# Patient Record
Sex: MALE | Race: WHITE
[De-identification: names, ages, dates, MRNs, and addresses within clinical notes are randomized per-mention and may not be internally consistent; named-entity substitution may affect disease eponyms.]

---

## 2017-10-29 ENCOUNTER — HOSPITAL ENCOUNTER (EMERGENCY)
Dept: HOSPITAL 10 - E/R | Age: 62
Discharge: HOME | End: 2017-10-29
Payer: COMMERCIAL

## 2017-10-29 VITALS
WEIGHT: 186.29 LBS | HEIGHT: 78 IN | BODY MASS INDEX: 21.55 KG/M2 | HEIGHT: 78 IN | WEIGHT: 186.29 LBS | BODY MASS INDEX: 21.55 KG/M2

## 2017-10-29 DIAGNOSIS — I89.1: Primary | ICD-10-CM

## 2017-10-29 PROCEDURE — 99284 EMERGENCY DEPT VISIT MOD MDM: CPT

## 2017-10-29 NOTE — ERD
ER Documentation


Chief Complaint


Chief Complaint


swollen right side of throat x2 days





HPI


62-year-old male presents emergency room with right lateral neck pain and 

swelling that started about 2-3 days ago.  The patient's describes as sharp and 

achy pain as moderate and radiates to his ear and his head.  He has no other 

associated symptoms including nasal congestion, cough, sore throat, dental 

pain.  He denies fevers or chills.  He denies trouble swallowing.





ROS


All systems reviewed and are negative except as per history of present illness.





Medications


Home Meds


Active Scripts


Hydrocodone/Acetaminophen (Norco 5-325 Tablet) 1 Each Tablet, 1 TAB PO Q6H Y 

for PAIN, #7 TAB


   Prov:MARTHA TREJO PA-C         10/29/17


Prednisone* (Prednisone*) 20 Mg Tab, 40 MG PO DAILY for 5 Days, TAB


   Prov:MARTHA TREJO PA-C         10/29/17


Amoxicillin/Potassium Clav (Amox-Clav 875-125 mg Tablet) 875-125 mg Tab, 1 TAB 

PO BID for 7 Days, #14 TAB


   Prov:MARTHA TREJO PA-C         10/29/17





PMhx/Soc


History of Surgery:  No


Anesthesia Reaction:  No


Hx Neurological Disorder:  No


Hx Respiratory Disorders:  No


Hx Cardiac Disorders:  Yes


Hx Psychiatric Problems:  No


Hx Miscellaneous Medical Probl:  No


Hx Alcohol Use:  No


Hx Substance Use:  No


Hx Tobacco Use:  No


Smoking Status:  Never smoker





Physical Exam


Vitals





Vital Signs








  Date Time  Temp Pulse Resp B/P Pulse Ox O2 Delivery O2 Flow Rate FiO2


 


10/29/17 10:58 98.8 81 20 131/71 99   








Physical Exam


General: Well-developed, well-nourished.  The patient appears in no acute 

distress.


HEENT: Head is normocephalic, atraumatic.  No scleral icterus.  Right 

anterolateral neck has swelling, and is tender to palpation, there is no 

fluctuance.  Lymph node is mobile.  TMs are normal, oropharynx is clear, there 

is poor dentition but no dental abscess seen.  \


Neck: Supple.  Nontender.  no Meningismus


Lungs: Clear to auscultation.  Normal air movement.


Heart: Regular rate and rhythm.  S1 and S2 are normal.  No murmurs, gallops, or 

rubs.


Abdomen: Nondistended.


Extremities: No clubbing or cyanosis. Moving extremities x 4. No weakness.


Neurologic: Alert and oriented 3.  No focal deficits. Normal speech and gait.


Skin: Normal turgor.  No rash or lesions.


Results 24 hrs





 Current Medications








 Medications


  (Trade)  Dose


 Ordered  Sig/Jorge Luis


 Route


 PRN Reason  Start Time


 Stop Time Status Last Admin


Dose Admin


 


 Acetaminophen/


 Hydrocodone Bitart


  (Norco (5/325))  1 tab  ONCE  ONCE


 PO


   10/29/17 12:00


 10/29/17 12:01 DC 10/29/17 12:04


 











Procedures/MDM


62-year-old male presents with right-sided neck swelling, most consistent with 

lymphangitis.  Differentials include viral versus bacterial infection, versus 

abscess.  Suspicion at this time for a mass is low given the acute presentation

, and tenderness.  There is no evidence of an abscess, Jesus's angina, 

submandibular infection.  HEENT examination otherwise is normal, no evidence of 

otitis media, meningitis, dental infection, dental abscess, strep pharyngitis.  

Patient will be treated with prednisone, Augmentin as well as Norco for pain 

control, and was advised to recheck with the primary care doctor 1-2 days.  If 

any worsening symptoms occur he is to return to the emergency room.  If the 

swelling does not resolve the patient was advised that he will need to follow-

up with his primary care doctor for sonographic imaging of the neck.





Departure


Diagnosis:  


 Primary Impression:  


 Lymphangitis


Condition:  Good


Patient Instructions:  MARTHA Latif PA-C Oct 29, 2017 12:49

## 2019-03-11 ENCOUNTER — HOSPITAL ENCOUNTER (INPATIENT)
Dept: HOSPITAL 91 - E/R | Age: 64
LOS: 1 days | Discharge: HOME | DRG: 661 | End: 2019-03-12
Payer: COMMERCIAL

## 2019-03-11 ENCOUNTER — HOSPITAL ENCOUNTER (INPATIENT)
Dept: HOSPITAL 10 - E/R | Age: 64
LOS: 1 days | Discharge: HOME | DRG: 661 | End: 2019-03-12
Attending: INTERNAL MEDICINE | Admitting: INTERNAL MEDICINE
Payer: COMMERCIAL

## 2019-03-11 VITALS
BODY MASS INDEX: 30.68 KG/M2 | BODY MASS INDEX: 30.68 KG/M2 | WEIGHT: 190.92 LBS | HEIGHT: 66 IN | HEIGHT: 66 IN | WEIGHT: 190.92 LBS

## 2019-03-11 VITALS — DIASTOLIC BLOOD PRESSURE: 73 MMHG | SYSTOLIC BLOOD PRESSURE: 137 MMHG | HEART RATE: 77 BPM | RESPIRATION RATE: 18 BRPM

## 2019-03-11 DIAGNOSIS — Z79.84: ICD-10-CM

## 2019-03-11 DIAGNOSIS — E78.5: ICD-10-CM

## 2019-03-11 DIAGNOSIS — Z79.82: ICD-10-CM

## 2019-03-11 DIAGNOSIS — E11.9: ICD-10-CM

## 2019-03-11 DIAGNOSIS — N13.2: Primary | ICD-10-CM

## 2019-03-11 DIAGNOSIS — I10: ICD-10-CM

## 2019-03-11 DIAGNOSIS — N17.9: ICD-10-CM

## 2019-03-11 LAB
ADD MAN DIFF?: NO
ADD UMIC: YES
ANION GAP: 17 (ref 5–13)
BASOPHIL #: 0 10^3/UL (ref 0–0.1)
BASOPHILS %: 0.4 % (ref 0–2)
BLOOD UREA NITROGEN: 27 MG/DL (ref 7–20)
CALCIUM: 9.4 MG/DL (ref 8.4–10.2)
CARBON DIOXIDE: 22 MMOL/L (ref 21–31)
CHLORIDE: 103 MMOL/L (ref 97–110)
CREATININE: 1.55 MG/DL (ref 0.61–1.24)
EOSINOPHILS #: 0.2 10^3/UL (ref 0–0.5)
EOSINOPHILS %: 1.9 % (ref 0–7)
GLUCOSE: 122 MG/DL (ref 70–220)
HEMATOCRIT: 46.5 % (ref 42–52)
HEMOGLOBIN: 15.2 G/DL (ref 14–18)
IMMATURE GRANS #M: 0.05 10^3/UL (ref 0–0.03)
IMMATURE GRANS % (M): 0.5 % (ref 0–0.43)
LYMPHOCYTES #: 2.1 10^3/UL (ref 0.8–2.9)
LYMPHOCYTES %: 19.4 % (ref 15–51)
MEAN CORPUSCULAR HEMOGLOBIN: 26.3 PG (ref 29–33)
MEAN CORPUSCULAR HGB CONC: 32.7 G/DL (ref 32–37)
MEAN CORPUSCULAR VOLUME: 80.6 FL (ref 82–101)
MEAN PLATELET VOLUME: 10.2 FL (ref 7.4–10.4)
MONOCYTE #: 1 10^3/UL (ref 0.3–0.9)
MONOCYTES %: 9.2 % (ref 0–11)
NEUTROPHIL #: 7.4 10^3/UL (ref 1.6–7.5)
NEUTROPHILS %: 68.6 % (ref 39–77)
NUCLEATED RED BLOOD CELLS #: 0 10^3/UL (ref 0–0)
NUCLEATED RED BLOOD CELLS%: 0 /100WBC (ref 0–0)
PLATELET COUNT: 239 10^3/UL (ref 140–415)
POTASSIUM: 4.4 MMOL/L (ref 3.5–5.1)
RED BLOOD COUNT: 5.77 10^6/UL (ref 4.7–6.1)
RED CELL DISTRIBUTION WIDTH: 14.1 % (ref 11.5–14.5)
SODIUM: 142 MMOL/L (ref 135–144)
UR ASCORBIC ACID: NEGATIVE MG/DL
UR BILIRUBIN (DIP): NEGATIVE MG/DL
UR BLOOD (DIP): (no result) MG/DL
UR CLARITY: CLEAR
UR COLOR: YELLOW
UR GLUCOSE (DIP): NEGATIVE MG/DL
UR KETONES (DIP): (no result) MG/DL
UR LEUKOCYTE ESTERASE (DIP): NEGATIVE LEU/UL
UR NITRITE (DIP): NEGATIVE MG/DL
UR PH (DIP): 5 (ref 5–9)
UR RBC: 16 /HPF (ref 0–5)
UR SPECIFIC GRAVITY (DIP): 1.02 (ref 1–1.03)
UR TOTAL PROTEIN (DIP): NEGATIVE MG/DL
UR UROBILINOGEN (DIP): NEGATIVE MG/DL
UR WBC: 1 /HPF (ref 0–5)
WHITE BLOOD COUNT: 10.8 10^3/UL (ref 4.8–10.8)

## 2019-03-11 PROCEDURE — 87086 URINE CULTURE/COLONY COUNT: CPT

## 2019-03-11 PROCEDURE — 93005 ELECTROCARDIOGRAM TRACING: CPT

## 2019-03-11 PROCEDURE — 82962 GLUCOSE BLOOD TEST: CPT

## 2019-03-11 PROCEDURE — 80048 BASIC METABOLIC PNL TOTAL CA: CPT

## 2019-03-11 PROCEDURE — C2617 STENT, NON-COR, TEM W/O DEL: HCPCS

## 2019-03-11 PROCEDURE — 74176 CT ABD & PELVIS W/O CONTRAST: CPT

## 2019-03-11 PROCEDURE — 85025 COMPLETE CBC W/AUTO DIFF WBC: CPT

## 2019-03-11 PROCEDURE — 36415 COLL VENOUS BLD VENIPUNCTURE: CPT

## 2019-03-11 PROCEDURE — 85730 THROMBOPLASTIN TIME PARTIAL: CPT

## 2019-03-11 PROCEDURE — 85610 PROTHROMBIN TIME: CPT

## 2019-03-11 PROCEDURE — 96375 TX/PRO/DX INJ NEW DRUG ADDON: CPT

## 2019-03-11 PROCEDURE — 81001 URINALYSIS AUTO W/SCOPE: CPT

## 2019-03-11 PROCEDURE — 74430 CONTRAST X-RAY BLADDER: CPT

## 2019-03-11 PROCEDURE — 74018 RADEX ABDOMEN 1 VIEW: CPT

## 2019-03-11 PROCEDURE — 71045 X-RAY EXAM CHEST 1 VIEW: CPT

## 2019-03-11 PROCEDURE — 99285 EMERGENCY DEPT VISIT HI MDM: CPT

## 2019-03-11 PROCEDURE — 96374 THER/PROPH/DIAG INJ IV PUSH: CPT

## 2019-03-11 RX ADMIN — LIDOCAINE HYDROCHLORIDE 1 MLS/HR: 10 INJECTION, SOLUTION EPIDURAL; INFILTRATION; INTRACAUDAL; PERINEURAL at 19:18

## 2019-03-11 RX ADMIN — TAMSULOSIN HYDROCHLORIDE 1 MG: 0.4 CAPSULE ORAL at 19:36

## 2019-03-11 RX ADMIN — KETOROLAC TROMETHAMINE 1 MG: 15 INJECTION, SOLUTION INTRAMUSCULAR; INTRAVENOUS at 19:18

## 2019-03-11 RX ADMIN — ONDANSETRON HYDROCHLORIDE 1 MG: 2 INJECTION, SOLUTION INTRAMUSCULAR; INTRAVENOUS at 19:18

## 2019-03-11 NOTE — ERD
ER Documentation


Chief Complaint


Chief Complaint





Complains of flank Hx of Kidney stones





HPI


63-year-old male, history of diabetes, hypertension, hyperlipidemia and 


ureterolithiasis presents to the ED complaining of left flank pain.  He was in 


his usual state of health until 2 weeks ago when he developed painless hematuria


and last night was awoken at 3 AM with severe, sharp, left flank pain creating 


to the left lower quadrant.  Pain is unrelieved by ibuprofen.  No exacerbating 


factors.  Nausea no vomiting, diarrhea or constipation.  No chest pain, 


palpitations, shortness of breath, fevers or chills.





ROS


All systems reviewed and are negative except as per history of present illness.





Medications


Home Meds


Active Scripts


Hydrocodone/Acetaminophen (Norco 5-325 Tablet) 1 Each Tablet, 1 EACH PO Q6 for 


pain, #20 TAB 0 Refills


   Prov:JOSE SAN MD         3/12/19


Levofloxacin* (Levaquin*) 500 Mg Tablet, 500 MG PO DAILY for UTI for 7 Days, #7 


TAB


   Prov:JOSE SAN MD         3/12/19


Levofloxacin* (Levaquin*) 500 Mg Tablet, 500 MG PO DAILY for UTI and kidney 


stones for 7 Days, #7 TAB


   Prov:JOSE SAN MD         3/12/19


Reported Medications


Simvastatin (Simvastatin) 20 Mg Tablet, 20 MG PO QHS, #30 TAB


   3/11/19


[Cholesterol]   No Conflict Check, 1 TAB PO QHS


   3/11/19


Aspirin* (Aspirin* EC) 81 Mg Tablet.dr, 81 MG PO DAILY, TAB


   3/11/19


Benazepril Hcl* (Benazepril Hcl*) 20 Mg Tablet, 20 MG PO DAILY, #30 TAB


   3/11/19


Sitagliptin* (Januvia*) 50 Mg Tablet, 50 MG PO DAILY, #30 TAB


   3/11/19


Metformin Hcl* (Metformin Hcl*) 1,000 Mg Tablet, 1000 MG PO WITH BREAKFAST 


DINNE, #60 TAB


   3/11/19


Discontinued Scripts


Hydrocodone/Acetaminophen (Norco 5-325 Tablet) 1 Each Tablet, 1 TAB PO Q6H PRN 


for PAIN, #7 TAB


   Prov:MARTHA TREJO PA-C         10/29/17


Prednisone* (Prednisone*) 20 Mg Tab, 40 MG PO DAILY for 5 Days, TAB


   Prov:MARTHA TREJO PA-C         10/29/17


Amoxicillin/Potassium Clav (Amox-Clav 875-125 mg Tablet) 875-125 mg Tab, 1 TAB 


PO BID for 7 Days, #14 TAB


   Prov:MARTHA TREJO PA-C         10/29/17





Allergies


Allergies:  


Coded Allergies:  


     No Known Allergy (Unverified , 3/11/19)





PMhx/Soc


Reviewed in chart. As per HPI.


History of Surgery:  Yes (EC Lithotripsy)


Anesthesia Reaction:  No


Hx Neurological Disorder:  No


Hx Respiratory Disorders:  No


Hx Cardiac Disorders:  Yes (Hypertension)


Hx Psychiatric Problems:  No


Hx Miscellaneous Medical Probl:  Yes (Nephrolithiasis, ureterolithiasis, 


hyperlipidemia, diabetes)


Hx Alcohol Use:  No


Hx Substance Use:  No


Hx Tobacco Use:  No





FmHx


Father: MI at 57.  No family history of cancer.





Physical Exam


Vitals





Vital Signs


  Date      Temp  Pulse  Resp  B/P (MAP)   Pulse Ox  O2          O2 Flow    FiO2


Time                                                 Delivery    Rate


   3/11/19           87    16      127/87       100  Room Air


     19:30                          (100)


   3/11/19  97.8     74    20      145/79        98


     14:17                          (101)





Physical Exam


Const:   Moderate distress due to pain.


Head:   Atraumatic 


Eyes:    Normal Conjunctiva


ENT:    Normal External Ears, Nose and Mouth.


Neck:               Full range of motion.  Nontender.  No lymphadenopathy.


Resp:   Breath sounds are equal and clear to auscultation bilaterally.  No rales


rhonchi or wheezes


Cardio:   Regular rate and rhythm, no murmurs


Abd:    Soft, non tender, non distended.  Normal pulsations.  Normal bowel 


sounds


Skin:   No petechiae or rashes


Back:   No midline or CVA tenderness.


Ext:    No cyanosis, or edema.  Pulses 4+ in all extremities.


Neur:   Awake and alert.  No focal deficit.


Psych:    Normal Mood and Affect


Result Diagram:  


3/11/19 1907                                                                    


           3/11/19 1907





Results 24 hrs





Laboratory Tests


              Test
                                  3/11/19
19:07


              White Blood Count                      10.8 10^3/ul


              Red Blood Count                        5.77 10^6/ul


              Hemoglobin                                15.2 g/dl


              Hematocrit                                   46.5 %


              Mean Corpuscular Volume                     80.6 fl


              Mean Corpuscular Hemoglobin                 26.3 pg


              Mean Corpuscular Hemoglobin
Concent      32.7 g/dl 



              Red Cell Distribution Width                  14.1 %


              Platelet Count                          239 10^3/UL


              Mean Platelet Volume                        10.2 fl


              Immature Granulocytes %                     0.500 %


              Neutrophils %                                68.6 %


              Lymphocytes %                                19.4 %


              Monocytes %                                   9.2 %


              Eosinophils %                                 1.9 %


              Basophils %                                   0.4 %


              Nucleated Red Blood Cells %             0.0 /100WBC


              Immature Granulocytes #               0.050 10^3/ul


              Neutrophils #                           7.4 10^3/ul


              Lymphocytes #                           2.1 10^3/ul


              Monocytes #                             1.0 10^3/ul


              Eosinophils #                           0.2 10^3/ul


              Basophils #                             0.0 10^3/ul


              Nucleated Red Blood Cells #             0.0 10^3/ul


              Urine Color                          YELLOW


              Urine Clarity                        CLEAR


              Urine pH                                        5.0


              Urine Specific Gravity                        1.020


              Urine Ketones                        TRACE mg/dL


              Urine Nitrite                        NEGATIVE mg/dL


              Urine Bilirubin                      NEGATIVE mg/dL


              Urine Urobilinogen                   NEGATIVE mg/dL


              Urine Leukocyte Esterase
            NEGATIVE
Den/ul


              Urine Microscopic RBC                       16 /HPF


              Urine Microscopic WBC                        1 /HPF


              Urine Hemoglobin                           2+ mg/dL


              Urine Glucose                        NEGATIVE mg/dL


              Urine Total Protein                  NEGATIVE mg/dl


              Sodium Level                             142 mmol/L


              Potassium Level                          4.4 mmol/L


              Chloride Level                           103 mmol/L


              Carbon Dioxide Level                      22 mmol/L


              Anion Gap                                        17


              Blood Urea Nitrogen                        27 mg/dl


              Creatinine                               1.55 mg/dl


              Est Glomerular Filtrat Rate
mL/min       46 mL/min 



              Glucose Level                             122 mg/dl


              Calcium Level                             9.4 mg/dl





Current Medications


 Medications
   Dose
          Sig/Jorge Luis
       Start Time
   Status  Last


 (Trade)       Ordered        Route
 PRN     Stop Time              Admin
Dose


                              Reason                                Admin


 Sodium         500 ml @ 
     Q1H STAT
      3/11/19       DC           3/11/19


Chloride       500 mls/hr     IV
            18:37
                       19:18



                                             3/11/19 19:36


 Morphine       4 mg           ONCE  STAT
    3/11/19       DC           3/11/19


Sulfate
                      IV
            18:37
                       19:17



(morphine)                                   3/11/19 18:42


 Ondansetron    4 mg           ONCE  STAT
    3/11/19       DC           3/11/19


HCl
  (Zofran                 IV
            18:37
                       19:18



Inj)                                         3/11/19 18:42


 Ketorolac
     10 mg          ONCE  STAT
    3/11/19       DC           3/11/19


Tromethamine
                 IV
            18:37
                       19:18



 (Toradol)                                   3/11/19 18:42


 Tamsulosin     0.4 mg         ONCE  ONCE
    3/11/19       DC           3/11/19


HCl
                          PO
            19:00
                       19:36



(Flomax)                                     3/11/19 19:01


 Ondansetron    4 mg           BRIDGE ORDER   3/11/19       DC       



HCl
  (Zofran                 PRN
 IV
       20:30



Inj)                          NAUSEA/VOMITI  3/12/19 13:32


                              NG


                650 mg         ER BRIDGE      3/11/19       DC       



Acetaminophen                 PRN
 PO
       20:30




  (Tylenol                   .MILD PAIN     3/12/19 13:32


Tab)                          1-3 OR TEMP








Procedures/MDM


DOCUMENTS REVIEWED:   ED nurse, prior records








IMAGING: 





                                        


PROCEDURE:   CT abdomen and pelvis without contrast. 


 


CLINICAL INDICATION:  Left flank pain


 


TECHNIQUE:   CT scan of the abdomen and pelvis without contrast was performed on


a multi-slice CT scanner.  The patient was scanned without intravenous contrast.


 3-D sagittal and coronal reformatted images were obtained from the axial source


images.  CTDI: 18.05 and DLP: 1224.05


 


One or more of the following dose reduction techniques were used:


 


Automated exposure control.


Adjustment of the mA and/or kV according to patient's size.


Use of iterative reconstruction technique.


 


DICOM images are available.


 


COMPARISON:   None. 


 


FINDINGS:


 


Incidental images through the lung bases reveal no evidence of focal basilar 


consolidation or pleural effusion.


 


The liver is somewhat low in attenuation suggesting hepatic steatosis. There is 


no evidence of intrahepatic biliary dilatation. The spleen, gallbladder, 


pancreas and adrenal glands are unremarkable 


 


There is severe left hydronephrosis secondary to a large calculus in the 


proximal left ureter at the level of L4. This calculus measures 7.4 mm TR by 


12.8 mm AP by 18.5 mm CC. Vascular calcifications are seen on the right. No 


additional renal calculi are identified. 


 


There are exophytic structure is emanating from the anterior and posterior 


inferior left kidney that likely represent renal cysts measuring 2.8 cm and 3.6 


cm. A similar exophytic lesion is seen emanating from the posterior mid right 


kidney measuring 3.3 cm.


 


No significant retroperitoneal adenopathy is identified.  Atherosclerotic 


changes are seen in the abdominal aorta without evidence of aneurysm.


 


The stomach is distended with fluid and debris. There is no evidence of small 


bowel obstruction. A normal appendix is identified. There are multiple colonic 


diverticula without evidence of diverticulitis..  No free fluid or free 


intraperitoneal air is identified.  Evaluation of the osseous structures reveals


no acute change.


 


IMPRESSION:


 


1.  There is severe left hydronephrosis secondary to a large calculus in the 


proximal left ureter at the level of L4.  This calculus measures 7.4 mm TR by 


12.8 mm AP by 18.5 mm CC


 


2.  There are exophytic structure is emanating from the anterior and posterior 


inferior left kidney that likely represent renal cysts measuring 2.8 cm and 3.6 


cm. A similar exophytic lesion is seen emanating from the posterior mid right 


kidney measuring 3.3 cm.


 


3.  There are multiple colonic diverticula without evidence of diverticulitis.


 


4.  Hepatic steatosis.


 


RPTAT:AAJJ


_____________________________________________ 


Guru Sofia Physician           Date    Time 


Electronically viewed and signed by Guru Sofia Physician on 2019 19:22





 


D:  2019 19:22  T:  2019 19:22


MC/














MEDICAL DECISION MAKIN-year-old male, history of diabetes, hypertension, 


hyperlipidemia and ureterolithiasis presents to the ED for evaluation of 


hematuria and left flank pain. CBC unremarkable for leukocytosis, anemia or 


thrombocytosis. Chemistry reveal elevated Bun/Cr consistent with renal 


insufficiency but no electrolyte abnormalities and hyperglycemia with anion gap 


acidosis. U/A positive for hematuria but no pyuria or infection. CT findings as 


above reveal an impressively large left ureteral calculus at L4 level with 


severe obstructive uropathy and hydronephrosis. Pain controlled with IV opiates.


Flomax given. No UTI or pyelonephritis and antibiotics deferred. Urology consul


serge. Admit to med/surg.





Counseled patient and family regarding diagnostic workup, diagnosis and need for


admission. 








CALLS/CONSULTS: Dr Atkinson will consult.


CARE TRANSFERRED: Time:. Dr Laguna.





Departure


Diagnosis:  


   Primary Impression:  


   Acute left flank pain


   Additional Impressions:  


   Ureterolithiasis


   Obstructed, uropathy


   Hydronephrosis, left


   Renal colic on left side


   Diabetes mellitus type 2 in obese


Condition:  Serious











SHERMAN IGLESIAS MD           Mar 11, 2019 18:32

## 2019-03-12 VITALS — SYSTOLIC BLOOD PRESSURE: 123 MMHG | HEART RATE: 76 BPM | RESPIRATION RATE: 12 BRPM | DIASTOLIC BLOOD PRESSURE: 76 MMHG

## 2019-03-12 VITALS — SYSTOLIC BLOOD PRESSURE: 129 MMHG | RESPIRATION RATE: 20 BRPM | HEART RATE: 74 BPM | DIASTOLIC BLOOD PRESSURE: 84 MMHG

## 2019-03-12 VITALS — SYSTOLIC BLOOD PRESSURE: 131 MMHG | DIASTOLIC BLOOD PRESSURE: 91 MMHG | HEART RATE: 66 BPM | RESPIRATION RATE: 17 BRPM

## 2019-03-12 VITALS — HEART RATE: 68 BPM | SYSTOLIC BLOOD PRESSURE: 122 MMHG | RESPIRATION RATE: 12 BRPM | DIASTOLIC BLOOD PRESSURE: 69 MMHG

## 2019-03-12 VITALS — DIASTOLIC BLOOD PRESSURE: 74 MMHG | HEART RATE: 80 BPM | SYSTOLIC BLOOD PRESSURE: 148 MMHG | RESPIRATION RATE: 20 BRPM

## 2019-03-12 VITALS — RESPIRATION RATE: 18 BRPM | DIASTOLIC BLOOD PRESSURE: 57 MMHG | HEART RATE: 68 BPM | SYSTOLIC BLOOD PRESSURE: 108 MMHG

## 2019-03-12 VITALS — SYSTOLIC BLOOD PRESSURE: 127 MMHG | DIASTOLIC BLOOD PRESSURE: 71 MMHG | RESPIRATION RATE: 16 BRPM | HEART RATE: 72 BPM

## 2019-03-12 VITALS — SYSTOLIC BLOOD PRESSURE: 129 MMHG | HEART RATE: 68 BPM | DIASTOLIC BLOOD PRESSURE: 65 MMHG | RESPIRATION RATE: 18 BRPM

## 2019-03-12 VITALS — RESPIRATION RATE: 13 BRPM | SYSTOLIC BLOOD PRESSURE: 112 MMHG | DIASTOLIC BLOOD PRESSURE: 63 MMHG | HEART RATE: 72 BPM

## 2019-03-12 VITALS — DIASTOLIC BLOOD PRESSURE: 88 MMHG | SYSTOLIC BLOOD PRESSURE: 138 MMHG | HEART RATE: 70 BPM | RESPIRATION RATE: 12 BRPM

## 2019-03-12 VITALS — HEART RATE: 76 BPM | SYSTOLIC BLOOD PRESSURE: 119 MMHG | DIASTOLIC BLOOD PRESSURE: 67 MMHG | RESPIRATION RATE: 16 BRPM

## 2019-03-12 VITALS — HEART RATE: 74 BPM | SYSTOLIC BLOOD PRESSURE: 126 MMHG | DIASTOLIC BLOOD PRESSURE: 71 MMHG | RESPIRATION RATE: 15 BRPM

## 2019-03-12 VITALS — DIASTOLIC BLOOD PRESSURE: 62 MMHG | RESPIRATION RATE: 23 BRPM | HEART RATE: 74 BPM | SYSTOLIC BLOOD PRESSURE: 124 MMHG

## 2019-03-12 VITALS — RESPIRATION RATE: 19 BRPM | DIASTOLIC BLOOD PRESSURE: 67 MMHG | HEART RATE: 80 BPM | SYSTOLIC BLOOD PRESSURE: 121 MMHG

## 2019-03-12 VITALS — HEART RATE: 83 BPM | SYSTOLIC BLOOD PRESSURE: 148 MMHG | DIASTOLIC BLOOD PRESSURE: 74 MMHG

## 2019-03-12 VITALS — HEART RATE: 70 BPM | RESPIRATION RATE: 13 BRPM | DIASTOLIC BLOOD PRESSURE: 68 MMHG | SYSTOLIC BLOOD PRESSURE: 119 MMHG

## 2019-03-12 VITALS — SYSTOLIC BLOOD PRESSURE: 121 MMHG | DIASTOLIC BLOOD PRESSURE: 66 MMHG | HEART RATE: 70 BPM | RESPIRATION RATE: 14 BRPM

## 2019-03-12 LAB
INR: 0.97
PARTIAL THROMBOPLASTIN TIME: 29 SEC (ref 23–35)
PROTIME: 13 SEC (ref 11.9–14.9)
PT RATIO: 1

## 2019-03-12 PROCEDURE — 0T778DZ DILATION OF LEFT URETER WITH INTRALUMINAL DEVICE, VIA NATURAL OR ARTIFICIAL OPENING ENDOSCOPIC: ICD-10-PCS

## 2019-03-12 PROCEDURE — 0T778DZ DILATION OF LEFT URETER WITH INTRALUMINAL DEVICE, VIA NATURAL OR ARTIFICIAL OPENING ENDOSCOPIC: ICD-10-PCS | Performed by: UROLOGY

## 2019-03-12 RX ADMIN — ATORVASTATIN CALCIUM 1 MG: 10 TABLET, FILM COATED ORAL at 21:21

## 2019-03-12 RX ADMIN — THIAMINE HYDROCHLORIDE 1 MLS/HR: 100 INJECTION, SOLUTION INTRAMUSCULAR; INTRAVENOUS at 21:22

## 2019-03-12 RX ADMIN — MORPHINE SULFATE 1 MG: 2 INJECTION, SOLUTION INTRAMUSCULAR; INTRAVENOUS at 19:53

## 2019-03-12 RX ADMIN — LEVOFLOXACIN 1 MG: 500 TABLET, FILM COATED ORAL at 21:21

## 2019-03-12 RX ADMIN — CEFTRIAXONE 1 MLS/HR: 1 INJECTION, SOLUTION INTRAVENOUS at 00:19

## 2019-03-12 RX ADMIN — INSULIN ASPART 1 UNIT: 100 INJECTION, SOLUTION INTRAVENOUS; SUBCUTANEOUS at 17:47

## 2019-03-12 RX ADMIN — FOLIC ACID SCH MLS/HR: 5 INJECTION, SOLUTION INTRAMUSCULAR; INTRAVENOUS; SUBCUTANEOUS at 09:00

## 2019-03-12 RX ADMIN — THIAMINE HYDROCHLORIDE 1 MLS/HR: 100 INJECTION, SOLUTION INTRAMUSCULAR; INTRAVENOUS at 09:00

## 2019-03-12 RX ADMIN — FOLIC ACID SCH MLS/HR: 5 INJECTION, SOLUTION INTRAMUSCULAR; INTRAVENOUS; SUBCUTANEOUS at 00:19

## 2019-03-12 RX ADMIN — FOLIC ACID SCH MLS/HR: 5 INJECTION, SOLUTION INTRAMUSCULAR; INTRAVENOUS; SUBCUTANEOUS at 21:22

## 2019-03-12 RX ADMIN — INSULIN ASPART 1 UNIT: 100 INJECTION, SOLUTION INTRAVENOUS; SUBCUTANEOUS at 11:54

## 2019-03-12 RX ADMIN — INSULIN ASPART 1 UNIT: 100 INJECTION, SOLUTION INTRAVENOUS; SUBCUTANEOUS at 00:50

## 2019-03-12 RX ADMIN — INSULIN ASPART 1 UNIT: 100 INJECTION, SOLUTION INTRAVENOUS; SUBCUTANEOUS at 05:53

## 2019-03-12 RX ADMIN — THIAMINE HYDROCHLORIDE 1 MLS/HR: 100 INJECTION, SOLUTION INTRAMUSCULAR; INTRAVENOUS at 00:19

## 2019-03-12 NOTE — PREAC
Date/Time of Note


Date/Time of Note


DATE: 3/12/19 


TIME: 09:47





Anesthesia Eval and Record


Evaluation


Time Pre-Procedure Interview


DATE: 3/12/19 


TIME: 09:47


Age


63


Sex


male


NPO:  8 hrs


Preoperative diagnosis


left hydronpehrosis, calculus


Planned procedure


cystoscopy, left ureteral JJ stent





Past Medical History


Past Medical History:  Includes


Cardio:  HTN, Dyslipidemia


Endo:  Diabetes


Renal:  OLGA LIDIA (creatinine 1.5), Other (kidney stones, hx left eswl 10 years ago)


Hepatic:  Other (hepatic steatosis)


GI:  Other (diverticulitis)





Surgery & Anesthesia Issues


No known issue





Meds


Anticoagulation:  Yes


Beta Blocker within 24 hr:  No


Reason Beta Blocker not given:  Pt. not on B-Blocker


Reported Medications


Simvastatin (Simvastatin) 20 Mg Tablet, 20 MG PO QHS, #30 TAB


   3/11/19


[Cholesterol]   No Conflict Check, 1 TAB PO QHS


   3/11/19


Aspirin* (Aspirin* EC) 81 Mg Tablet.dr, 81 MG PO DAILY, TAB


   3/11/19


Benazepril Hcl* (Benazepril Hcl*) 20 Mg Tablet, 20 MG PO DAILY, #30 TAB


   3/11/19


Sitagliptin* (Januvia*) 50 Mg Tablet, 50 MG PO DAILY, #30 TAB


   3/11/19


Metformin Hcl* (Metformin Hcl*) 1,000 Mg Tablet, 1000 MG PO WITH BREAKFAST 


DINNE, #60 TAB


   3/11/19


Discontinued Scripts


Hydrocodone/Acetaminophen (Norco 5-325 Tablet) 1 Each Tablet, 1 TAB PO Q6H PRN 


for PAIN, #7 TAB


   Prov:MARTHA TREJO PA-C         10/29/17


Prednisone* (Prednisone*) 20 Mg Tab, 40 MG PO DAILY for 5 Days, TAB


   Prov:MARTHA TREJO PA-C         10/29/17


Amoxicillin/Potassium Clav (Amox-Clav 875-125 mg Tablet) 875-125 mg Tab, 1 TAB 


PO BID for 7 Days, #14 TAB


   Prov:MARTHA TREJO PA-C         10/29/17





Current Medications


Ondansetron HCl (Zofran Inj) 4 mg BRIDGE ORDER PRN IV NAUSEA/VOMITING;  Start 


3/11/19 at 20:30;  Stop 3/12/19 at 20:29


Acetaminophen (Tylenol Tab) 650 mg ER BRIDGE PRN PO .MILD PAIN 1-3 OR TEMP;  


Start 3/11/19 at 20:30;  Stop 3/12/19 at 20:29


Sodium Chloride 1,000 ml @  100 mls/hr Q10H IV  Last administered on 3/12/19at 


00:19; Admin Dose 100 MLS/HR;  Start 3/11/19 at 23:00


Morphine Sulfate (morphine) 2 mg Q4H  PRN IV PAIN LEVEL 4-7;  Start 3/11/19 at 


23:00


Morphine Sulfate (morphine) 4 mg Q4H  PRN IV SEVERE PAIN LEVEL 7-10 Last 


administered on 3/12/19at 07:41; Admin Dose 4 MG;  Start 3/11/19 at 23:00


Ondansetron HCl (Zofran Inj) 4 mg Q4H  PRN IV NAUSEA AND/OR VOMITING;  Start 


3/11/19 at 23:00


Zolpidem Tartrate (Ambien) 5 mg HS  PRN PO INSOMNIA;  Start 3/11/19 at 23:00


Ceftriaxone Sodium 50 ml @  100 mls/hr Q24H IVPB  Last administered on 3/12/19at


 00:19; Admin Dose 100 MLS/HR;  Start 3/11/19 at 23:00


Diagnostic Test (Pha) (Accu-Chek) 1 ea 02 XX ;  Start 3/12/19 at 02:00


Insulin Aspart (Novolog Insulin Pen) NOVOLOG *MILD* ALGORI... Q6 SC  Last 


administered on 3/12/19at 05:53; Admin Dose 1 UNIT;  Start 3/12/19 at 00:00


Miscellaneous Information 1 ea NOTE XX ;  Start 3/11/19 at 23:30


Glucose (Glutose) 15 gm Q15M  PRN PO DECREASED GLUCOSE;  Start 3/11/19 at 23:30


Glucose (Glutose) 22.5 gm Q15M  PRN PO DECREASED GLUCOSE;  Start 3/11/19 at 


23:30


Dextrose (D50w Syringe) 25 ml Q15M  PRN IV DECREASED GLUCOSE;  Start 3/11/19 at 


23:30


Dextrose (D50w Syringe) 50 ml Q15M  PRN IV DECREASED GLUCOSE;  Start 3/11/19 at 


23:30


Glucagon (Glucagen) 1 mg Q15M  PRN IM DECREASED GLUCOSE;  Start 3/11/19 at 23:30


Glucose (Glutose) 15 gm Q15M  PRN BUCCAL DECREASED GLUCOSE;  Start 3/11/19 at 


23:30


Atorvastatin Calcium (Lipitor) 10 mg DAILY@21 PO ;  Start 3/12/19 at 21:00


Meds reviewed:  Yes





Allergies


Coded Allergies:  


     No Known Allergy (Unverified , 3/11/19)


Allergies Reviewed:  Yes





Labs/Studies


Labs Reviewed:  Reviewed by anesthesiologist


Result Diagram:  


3/11/19 1907                                                                    


            3/11/19 1907





Laboratory Tests


3/11/19 19:07








Pregnancy test:  N/A


Studies:  ECG (NSR), CXR





Pre-procedure Exam


Last vitals





Vital Signs


  Date      Temp  Pulse  Resp  B/P (MAP)   Pulse Ox  O2          O2 Flow    FiO2


Time                                                 Delivery    Rate


   3/12/19  97.8     68    18      108/57        96


     07:44                           (74)


   3/11/19                                           Room Air


     21:39





Airway:  Adequate mouth opening, Adequate thyromental dist


Mallampati:  Mallampati II


Teeth:  Normal


Lung:  Normal


Heart:  Normal





ASA Physical Status


ASA physical status:  3


Emergency:  E





Planned Anesthetic


General/MAC:  LMA





Planned Pain Management


Parenteral pain med, Other neuraxial med





Pre-operative Attestations


Prior to commencing anesthesia and surgery, the patient was re-evaluated, there 


was verification of:


*The patient's identity


*The results of appropriate recent lab work and preoperative vital signs


*The above evaluation not changing prior to induction


*Anesthetic plan, risk benefits, alternative and complications discussed with 


patient/family; questions answered; patient/family understands, accepts and 


wishes to proceed.











IRIS LOPEZ                  Mar 12, 2019 09:48

## 2019-03-12 NOTE — OPR
Date/Time of Note


Date/Time of Note


DATE: 3/12/19 


TIME: 13:29





Operative Report


Procedure Date:  Mar 12, 2019


Preoperative Diagnosis


Large left upper ureteral stone with left hydronephrosis


Postoperative Diagnosis


Same


Operation/Procedure Performed


Cystoscopy, insertion of left ureteral JJ stent 6 [place] by 22 cm long.


Surgeon


see signature line


Assistant


Scrub tech Braydon


Anesthesia Type:  general


Anesthesiologist:  ERICKA MACIAS MD


Estimated Blood Loss:  none


Transfusion


   none


Specimen


Urine for culture from the bladder and from left kidney


Grafts/Implants


Left ureteral JJ stent 6 [place] by 22 cm long


Complications


none


Pt Condition Post Procedure:  stable


Disposition:  PACU


Indications


Large left upper ureteral stone with obstruction and hydronephrosis


Procedure Description


Patient was brought to the operating room and given general anesthesia.  Patient


was positioned in the lithotomy position.  Timeout was done and the patient was 


identified by his name, birthdate, the procedure on the side of the procedure 


is.  Patient was given 2 g of Ancef IV at the start of the procedure.  #21 


[place] cystoscope sheath was introduced under direct vision through the penile 


urethra all the way to the bladder.  Spot films were taken between the bladder 


and the kidney.  The left upper ureteral stone was visualized and it is faintly 


radiopaque.  I then cannulated the left ureteral orifice with a 5 [place] open 


ended ureteral catheter and advanced it to the level of the stone.  Then I used 


a mixture of 2% lidocaine jelly with equal amount of normal saline and injected 


that in the left ureter just below the stone to lubricate that area and 


facilitate the passage of the zip wire.  I then inserted the 0.035 zip wire 


through the 5 [place] open ended and advanced it to the level of the stone.  


First it would not advance because of the stone obstructing the ureter.  But 


with some manipulation it did go up to the kidney.  Then I advanced the open 


ended on it and then removed the wire and aspirated urine from the kidney for 


culture and sensitivity.  A hydronephrotic drip was noticed.  I then 


reintroduced the zip wire and removed the open ended.  Then I advanced a 6 


[place] by 22 cm long JJ stent on the wire.  I had it proximal and coiling in the


kidney and the distal end coiling into the bladder.  The bladder was then 


emptied and the patient was transferred to the recovery room in a stable and 


satisfactory condition.











YANNA ARTEAGA MD            Mar 12, 2019 13:36

## 2019-03-12 NOTE — HP
DATE OF ADMISSION: 03/11/2019

 

CHIEF COMPLAINT:  Left flank pain.

 

HISTORY OF PRESENT ILLNESS:  A 63-year-old gentleman with a history of hypertension, type 2 diabetes 
mellitus, hyperlipidemia, and kidney stones, presented to emergency room with complaint of left flank
 pain, left-sided abdominal pain and gross hematuria.  The patient reports having on and off hematuri
a for several weeks.  He woke up at 3 o'clock in the morning with severe sharp left flank pain.  He d
enies any fevers or chills.  No dysuria.

 

Initial evaluation included a CAT scan of abdomen and pelvis.  This showed 7.5 mm stone in the left u
reter with associated hydronephrosis.  The urine was not contaminated.

 

PAST MEDICAL HISTORY:

1.  Hypertension.

2.  Type 2 diabetes mellitus.

3.  Hyperlipidemia.

4.  History of ureterolithiasis.

 

MEDICATIONS PRIOR TO ADMISSION:

1.  Aspirin.

2.  Benazepril.

3.  Januvia.

4.  Metformin.

 

SOCIAL HISTORY:  The patient denies tobacco use and drinks alcohol on social occasions.

 

PHYSICAL EXAMINATION:

GENERAL:  Well-developed, well-nourished male who is in no apparent distress.

VITAL SIGNS:  Stable.  He is afebrile.

HEENT:  Extraocular muscles intact.  Pupils equal and reactive to light bilaterally.  Sclerae are ani
cteric.  Oropharynx is clear and moist.

NECK:  Supple, no JVD, no carotid bruits.

LUNGS:  Clear to auscultation bilaterally.

CARDIAC:  Regular rate and rhythm.  No murmurs, rubs or gallops.

ABDOMEN:  Soft, obese.  Mild left-sided tenderness to palpation.

BACK:  No CVA tenderness.

EXTREMITIES:  No clubbing, cyanosis, or edema.

NEUROLOGICAL:  Nonfocal.

 

LABORATORY DATA:  White blood cell count 10.8, hemoglobin 15.2, platelet count 239,000.  BUN is 27, c
reatinine 1.55.  Calcium is 9.4.

 

IMAGING STUDY:  CAT scan showed severe left hydronephrosis secondary to large calculus in the proxima
l left ureter at the level of L4.  Calculus measures 7.4 mm x 0.8 mm x 18.5 mm.

 

ASSESSMENT:

1.  A 63-year-old male presenting with severe left flank pain.  He was found to have a large left ure
teral stone with associated severe hydronephrosis.

2.  History of nephrolithiasis in the past.

3.  Hypertension.

4.  Type 2 diabetes mellitus.

5.  Hyperlipidemia.

6.  Stage III chronic kidney disease.

 

PLAN:

1.  Placing med/surg observation.

2.  IV fluid hydration.

3.  Pain control.

4.  N.p.o.

5.  Proceed with ureteral stent placement and possible extracorporeal shock wave lithotripsy.

6.  Urology followup is appreciated.

7.  Discharge planning following the procedure.

 

 

Dictated By: MARCELLA LEE/JORJE

DD:    03/12/2019 09:54:17

DT:    03/12/2019 10:20:55

Conf#: [id number]

DID#:  [id number]

CC: MARCELLA MARSHALL MD; YANNA ARTEAGA MD;*EndCC*

## 2019-03-12 NOTE — RADRPT
Vent Rate: 69 bpm

RR Interval: 0 msec

[place] Interval: 148 msec

QRS Duration: 80 msec

QT Interval: 390 msec

QTC Interval: 417 msec

P-R-T Axis: 65 - 29 - 40 degrees

 

 Normal sinus rhythm

 Low voltage QRS

 Borderline ECG

 

Electronically Signed By: Alexandre Awan

## 2019-03-12 NOTE — CONS
Assessment/Plan


Assessment/Plan


Hospital Course (Demo Recall)


63-year-old male, history of diabetes, hypertension, hyperlipidemia and 


ureterolithiasis presented to the ED complaining of left flank pain.  He was in 


his usual state of health until 2 weeks ago when he developed painless hematuria


and last night was awoken at 3 AM with severe, sharp, left flank pain radiating 


to the left lower quadrant.  Pain was unrelieved by ibuprofen.  No exacerbating 


factors.  He had nausea but no vomiting, diarrhea or constipation.  No chest 


pain, palpitations, shortness of breath, fevers or chills.  He has had 


extracorporeal shockwave lithotripsy to a left renal stone about 10 years ago.  


And he states he did pass multiple small stones before and he has given them to 


his primary care doctor for analysis but he does not know the results.


On the physical exam he does have left flank tenderness and left side of the 


abdomen is tender.  I did review the CT scan with him and his son who was at his


bedside I did show them the pictures and discussed with them the plan which 


would be to do a cystoscopy and insert a left ureteral JJ stent.  And at a later


date he will need either a ureteroscopy with laser lithotripsy or extracorporeal


shockwave lithotripsy depending on the location of the stone at that time.  The 


patient as well as his son did understand the procedure and they are agreeable 


to proceed.





Consultation Date/Type/Reason


Admit Date/Time


Mar 11, 2019 at 20:31


Date of Consultation:  Mar 12, 2019


Type of Consult


Urology


Reason for Consultation


Left upper ureteral stone


Requesting Provider:  MARCELLA MARSHALL MD


Date/Time of Note


DATE: 3/12/19 


TIME: 08:45





Hx of Present Illness


63-year-old male, history of diabetes, hypertension, hyperlipidemia and 


ureterolithiasis presented to the ED complaining of left flank pain.  He was in 


his usual state of health until 2 weeks ago when he developed painless hematuria


and last night was awoken at 3 AM with severe, sharp, left flank pain radiating 


to the left lower quadrant.  Pain was unrelieved by ibuprofen.  No exacerbating 


factors.  He had nausea but no vomiting, diarrhea or constipation.  No chest 


pain, palpitations, shortness of breath, fevers or chills.  He has had 


extracorporeal shockwave lithotripsy to a left renal stone about 10 years ago.  


And he states he did pass multiple small stones before and he has given them to 


his primary care doctor for analysis but he does not know the results.


Constitutional:  no complaints


Eyes:  no complaints


ENT:  no complaints


Respiratory:  no complaints; 


   No shortness of breath


Cardiovascular:  no complaints


Gastrointestinal:  pain (Left side of abdomen)


Genitourinary:  flank pain (Left side), hematuria


Musculoskeletal:  no complaints


Skin:  no complaints


Neurologic:  no complaints


Endocrine:  no complaints


Lymphatic:  no complaints


Immunologic:  no complaints





Past Medical History


Medical History:  diabetes, high cholesterol, hypertension, other (Kidney 


stones)


Home Meds


Reported Medications


Simvastatin (Simvastatin) 20 Mg Tablet, 20 MG PO QHS, #30 TAB


   3/11/19


[Cholesterol]   No Conflict Check, 1 TAB PO QHS


   3/11/19


Aspirin* (Aspirin* EC) 81 Mg Tablet.dr, 81 MG PO DAILY, TAB


   3/11/19


Benazepril Hcl* (Benazepril Hcl*) 20 Mg Tablet, 20 MG PO DAILY, #30 TAB


   3/11/19


Sitagliptin* (Januvia*) 50 Mg Tablet, 50 MG PO DAILY, #30 TAB


   3/11/19


Metformin Hcl* (Metformin Hcl*) 1,000 Mg Tablet, 1000 MG PO WITH BREAKFAST 


DINNE, #60 TAB


   3/11/19


Discontinued Scripts


Hydrocodone/Acetaminophen (Norco 5-325 Tablet) 1 Each Tablet, 1 TAB PO Q6H PRN 


for PAIN, #7 TAB


   Prov:MARTHA TREJO PA-C         10/29/17


Prednisone* (Prednisone*) 20 Mg Tab, 40 MG PO DAILY for 5 Days, TAB


   Prov:MARTHA TREJO PA-C         10/29/17


Amoxicillin/Potassium Clav (Amox-Clav 875-125 mg Tablet) 875-125 mg Tab, 1 TAB 


PO BID for 7 Days, #14 TAB


   Prov:MARTHA TREJO PA-C         10/29/17


Medications





Current Medications


Ondansetron HCl (Zofran Inj) 4 mg BRIDGE ORDER PRN IV NAUSEA/VOMITING;  Start 


3/11/19 at 20:30;  Stop 3/12/19 at 20:29


Acetaminophen (Tylenol Tab) 650 mg ER BRIDGE PRN PO .MILD PAIN 1-3 OR TEMP;  


Start 3/11/19 at 20:30;  Stop 3/12/19 at 20:29


Sodium Chloride 1,000 ml @  100 mls/hr Q10H IV  Last administered on 3/12/19at 


00:19; Admin Dose 100 MLS/HR;  Start 3/11/19 at 23:00


Morphine Sulfate (morphine) 2 mg Q4H  PRN IV PAIN LEVEL 4-7;  Start 3/11/19 at 


23:00


Morphine Sulfate (morphine) 4 mg Q4H  PRN IV SEVERE PAIN LEVEL 7-10 Last 


administered on 3/12/19at 07:41; Admin Dose 4 MG;  Start 3/11/19 at 23:00


Ondansetron HCl (Zofran Inj) 4 mg Q4H  PRN IV NAUSEA AND/OR VOMITING;  Start 


3/11/19 at 23:00


Zolpidem Tartrate (Ambien) 5 mg HS  PRN PO INSOMNIA;  Start 3/11/19 at 23:00


Ceftriaxone Sodium 50 ml @  100 mls/hr Q24H IVPB  Last administered on 3/12/19at


 00:19; Admin Dose 100 MLS/HR;  Start 3/11/19 at 23:00


Diagnostic Test (Pha) (Accu-Chek) 1 ea 02 XX ;  Start 3/12/19 at 02:00


Insulin Aspart (Novolog Insulin Pen) NOVOLOG *MILD* ALGORI... Q6 SC  Last 


administered on 3/12/19at 05:53; Admin Dose 1 UNIT;  Start 3/12/19 at 00:00


Miscellaneous Information 1 ea NOTE XX ;  Start 3/11/19 at 23:30


Glucose (Glutose) 15 gm Q15M  PRN PO DECREASED GLUCOSE;  Start 3/11/19 at 23:30


Glucose (Glutose) 22.5 gm Q15M  PRN PO DECREASED GLUCOSE;  Start 3/11/19 at 


23:30


Dextrose (D50w Syringe) 25 ml Q15M  PRN IV DECREASED GLUCOSE;  Start 3/11/19 at 


23:30


Dextrose (D50w Syringe) 50 ml Q15M  PRN IV DECREASED GLUCOSE;  Start 3/11/19 at 


23:30


Glucagon (Glucagen) 1 mg Q15M  PRN IM DECREASED GLUCOSE;  Start 3/11/19 at 23:30


Glucose (Glutose) 15 gm Q15M  PRN BUCCAL DECREASED GLUCOSE;  Start 3/11/19 at 


23:30


Atorvastatin Calcium (Lipitor) 10 mg DAILY@21 PO ;  Start 3/12/19 at 21:00


Allergies:  


Coded Allergies:  


     No Known Allergy (Unverified , 3/11/19)





Past Surgical History


Past Surgical Hx:  other (Left extracorporeal shockwave lithotripsy)





Social History


Alcohol Use:  occasionally


Smoking Status:  Never smoker


Drug Use:  none





Exam/Review of Systems


Exam


Vitals





Vital Signs


  Date      Temp  Pulse  Resp  B/P (MAP)   Pulse Ox  O2          O2 Flow    FiO2


Time                                                 Delivery    Rate


   3/12/19  97.8     68    18      108/57        96


     07:44                           (74)


   3/11/19                                           Room Air


     21:39








Intake and Output





3/11/19


3/11/19


3/12/19





1515:00


23:00


07:00





IntakeIntake Total


600 ml





OutputOutput Total


200 ml





BalanceBalance


400 ml











Constitutional:  alert


Psych:  no complaints


Head:  normocephalic


Eyes:  nl conjunctiva


ENMT:  nl external ears & nose


Neck:  supple


Respiratory:  normal air movement; 


   No wheezing


Cardiovascular:  regular rate and rhythm; 


   No jugular venous distention (JVD)


Gastrointestinal:  soft


Genitourinary - Male:  CVA tenderness (Left side)


Extremities:  No calf tenderness


Neurological:  nl mental status


Skin:  nl turgor





Results


Result Diagram:  


3/11/19 1907                                                                    


            3/11/19 1907





Results 24hrs





Laboratory Tests


Test
                 3/11/19
19:07  3/12/19
00:50  3/12/19
05:50  3/12/19
07:46


White Blood Count            10.8


Red Blood Count              5.77


Hemoglobin                   15.2


Hematocrit                   46.5


Mean Corpuscular            80.6  L


Volume


Mean Corpuscular            26.3  L


Hemoglobin


Mean Corpuscular            32.7  
  
              
              



Hemoglobin
Concent


Red Cell                     14.1


Distribution Width


Platelet Count                239


Mean Platelet Volume         10.2


Immature                   0.500  H


Granulocytes %


Neutrophils %                68.6


Lymphocytes %                19.4


Monocytes %                   9.2


Eosinophils %                 1.9


Basophils %                   0.4


Nucleated Red Blood           0.0


Cells %


Immature                   0.050  H


Granulocytes #


Neutrophils #                 7.4


Lymphocytes #                 2.1


Monocytes #                  1.0  H


Eosinophils #                 0.2


Basophils #                   0.0


Nucleated Red Blood           0.0


Cells #


Urine Color           YELLOW


Urine Clarity         CLEAR


Urine pH                      5.0


Urine Specific              1.020


Gravity


Urine Ketones         TRACE  A


Urine Nitrite         NEGATIVE


Urine Bilirubin       NEGATIVE


Urine Urobilinogen    NEGATIVE


Urine Leukocyte       NEGATIVE


Esterase


Urine Microscopic             16  H


RBC


Urine Microscopic               1


WBC


Urine Hemoglobin              2+  H


Urine Glucose         NEGATIVE


Urine Total Protein   NEGATIVE


Sodium Level                  142


Potassium Level               4.4


Chloride Level                103


Carbon Dioxide Level           22


Anion Gap                     17  H


Blood Urea Nitrogen           27  H


Creatinine                  1.55  H


Est Glomerular               46  L
  
              
              



Filtrat Rate
mL/min


Glucose Level                 122


Calcium Level                 9.4


Bedside Glucose                             233  H          160            196


Test
                 3/12/19
07:54  
              
              



Bedside Glucose               171








Imaging


Imaging


CT scan of the abdomen and pelvis:


1.  There is severe left hydronephrosis secondary to a large calculus in the 


proximal left ureter at the level of L4.  This calculus measures 7.4 mm TR by 


12.8 mm AP by 18.5 mm CC


 


2.  There are exophytic structure is emanating from the anterior and posterior 


inferior left kidney that likely represent renal cysts measuring 2.8 cm and 3.6 


cm. A similar exophytic lesion is seen emanating from the posterior mid right 


kidney measuring 3.3 cm.


 


3.  There are multiple colonic diverticula without evidence of diverticulitis.


 


4.  Hepatic steatosis.





Medications


Medication





Current Medications


Ondansetron HCl (Zofran Inj) 4 mg BRIDGE ORDER PRN IV NAUSEA/VOMITING;  Start 


3/11/19 at 20:30;  Stop 3/12/19 at 20:29


Acetaminophen (Tylenol Tab) 650 mg ER BRIDGE PRN PO .MILD PAIN 1-3 OR TEMP;  


Start 3/11/19 at 20:30;  Stop 3/12/19 at 20:29


Sodium Chloride 1,000 ml @  100 mls/hr Q10H IV  Last administered on 3/12/19at 


00:19; Admin Dose 100 MLS/HR;  Start 3/11/19 at 23:00


Morphine Sulfate (morphine) 2 mg Q4H  PRN IV PAIN LEVEL 4-7;  Start 3/11/19 at 


23:00


Morphine Sulfate (morphine) 4 mg Q4H  PRN IV SEVERE PAIN LEVEL 7-10 Last 


administered on 3/12/19at 07:41; Admin Dose 4 MG;  Start 3/11/19 at 23:00


Ondansetron HCl (Zofran Inj) 4 mg Q4H  PRN IV NAUSEA AND/OR VOMITING;  Start 


3/11/19 at 23:00


Zolpidem Tartrate (Ambien) 5 mg HS  PRN PO INSOMNIA;  Start 3/11/19 at 23:00


Ceftriaxone Sodium 50 ml @  100 mls/hr Q24H IVPB  Last administered on 3/12/19at


 00:19; Admin Dose 100 MLS/HR;  Start 3/11/19 at 23:00


Diagnostic Test (Pha) (Accu-Chek) 1 ea 02 XX ;  Start 3/12/19 at 02:00


Insulin Aspart (Novolog Insulin Pen) NOVOLOG *MILD* ALGORI... Q6 SC  Last 


administered on 3/12/19at 05:53; Admin Dose 1 UNIT;  Start 3/12/19 at 00:00


Miscellaneous Information 1 ea NOTE XX ;  Start 3/11/19 at 23:30


Glucose (Glutose) 15 gm Q15M  PRN PO DECREASED GLUCOSE;  Start 3/11/19 at 23:30


Glucose (Glutose) 22.5 gm Q15M  PRN PO DECREASED GLUCOSE;  Start 3/11/19 at 


23:30


Dextrose (D50w Syringe) 25 ml Q15M  PRN IV DECREASED GLUCOSE;  Start 3/11/19 at 


23:30


Dextrose (D50w Syringe) 50 ml Q15M  PRN IV DECREASED GLUCOSE;  Start 3/11/19 at 


23:30


Glucagon (Glucagen) 1 mg Q15M  PRN IM DECREASED GLUCOSE;  Start 3/11/19 at 23:30


Glucose (Glutose) 15 gm Q15M  PRN BUCCAL DECREASED GLUCOSE;  Start 3/11/19 at 


23:30


Atorvastatin Calcium (Lipitor) 10 mg DAILY@21 PO ;  Start 3/12/19 at 21:00











YANNA ARTEAGA MD            Mar 12, 2019 08:54

## 2019-03-12 NOTE — PAC
Date/Time of Note


Date/Time of Note


DATE: 3/12/19 


TIME: 13:29





Post-Anesthesia Notes


Post-Anesthesia Note


Last documented vital signs





Vital Signs


  Date      Temp  Pulse  Resp  B/P (MAP)   Pulse Ox  O2          O2 Flow    FiO2


Time                                                 Delivery    Rate


   3/12/19  97.8     68    18      108/57        96


     07:44                           (74)


   3/11/19                                           Room Air


     21:39





Activity:  WNL


Respiratory function:  WNL


Cardiovascular function:  WNL


Mental status:  Baseline


Pain reasonably controlled:  Yes


Hydration appropriate:  Yes


Nausea/Vomiting absent:  Yes


Comments


BP:134/67, P;78, Spo2:100%, T:98,8











ERICKA MACIAS MD              Mar 12, 2019 13:30

## 2019-03-13 NOTE — DS
DATE OF ADMISSION: 03/11/2019

DATE OF DISCHARGE: 03/12/2019

 

DISCHARGE DIAGNOSES:

1.  A 63-year-old male with left ureteral stone and associated hydronephrosis.

2.  Status post cystoscopy and insertion of left ureteral stent.

3.  Type 2 diabetes mellitus.

4.  Hypertension.

5.  Hyperlipidemia.

 

HOSPITAL COURSE:  A 63-year-old very pleasant male with history of nephrolithiasis, presented to [facility] room with complaint of severe left-sided abdominal pain and left flank pain.  CAT scan of abdom
en and pelvis showed severe left hydronephrosis secondary to large calculus in the proximal left at t
he level of L4.  The patient was started on IV fluid hydration and received pain control then he was 
seen in consultation by Dr. Atkinson.  The patient underwent cystoscopy and insertion of left ureteral
 stent.  There was no evidence of a UTI.  The patient was discharged home in a stable condition.  He 
was prescribed Levaquin and Norco.  The patient will follow up with his PCP and his own urologist in 
1 week.

 

 

Dictated By: MARCELLA MARSHALL MD

 

SK/NTS

DD:    03/13/2019 09:52:08

DT:    03/13/2019 18:55:27

Conf#: [id number]

DID#:  [id number]

CC: YANNA ATKINSON MD;*EndCC*